# Patient Record
Sex: MALE | Race: BLACK OR AFRICAN AMERICAN | Employment: FULL TIME | URBAN - METROPOLITAN AREA
[De-identification: names, ages, dates, MRNs, and addresses within clinical notes are randomized per-mention and may not be internally consistent; named-entity substitution may affect disease eponyms.]

---

## 2017-11-28 ENCOUNTER — OFFICE VISIT (OUTPATIENT)
Dept: FAMILY MEDICINE CLINIC | Facility: CLINIC | Age: 37
End: 2017-11-28

## 2017-11-28 VITALS
BODY MASS INDEX: 44.1 KG/M2 | TEMPERATURE: 99 F | RESPIRATION RATE: 16 BRPM | HEIGHT: 71 IN | DIASTOLIC BLOOD PRESSURE: 80 MMHG | OXYGEN SATURATION: 98 % | SYSTOLIC BLOOD PRESSURE: 128 MMHG | WEIGHT: 315 LBS | HEART RATE: 80 BPM

## 2017-11-28 DIAGNOSIS — J02.0 STREP PHARYNGITIS: Primary | ICD-10-CM

## 2017-11-28 PROCEDURE — 87880 STREP A ASSAY W/OPTIC: CPT | Performed by: NURSE PRACTITIONER

## 2017-11-28 PROCEDURE — 99203 OFFICE O/P NEW LOW 30 MIN: CPT | Performed by: NURSE PRACTITIONER

## 2017-11-28 RX ORDER — AMOXICILLIN 875 MG/1
875 TABLET, COATED ORAL 2 TIMES DAILY
Qty: 20 TABLET | Refills: 0 | Status: SHIPPED | OUTPATIENT
Start: 2017-11-28 | End: 2017-12-08

## 2017-11-28 NOTE — PROGRESS NOTES
CHIEF COMPLAINT:   Patient presents with:  Cold: post nasal drip,congestion and sore throat sx x 4 days. HPI:   Axel Fabry is a 30year old male presents to clinic with symptoms of sore throat. Patient has had for 4 days.  Symptoms have been worsen THROAT: oral mucosa pink, moist. Posterior pharynx erythematous and injected. No exudates. Tonsils 3/4. Breath is malodorous. No uvular deviation. No drooling. NECK: supple  LUNGS: clear to auscultation bilaterally, no wheezes or rhonchi.  Breathing is no · Rest at home. Drink plenty of fluids to you won't get dehydrated. · No work or school for the first 2 days of taking the antibiotics. After this time, you will not be contagious.  You can then return to school or work if you are feeling better.   · Antoine Wong © 9644-0773 [employer] [address]. [address] [facility] [place] [facility] [place]. All rights reserved. This information is not intended as a substitute for professional medical care. Always follow your healthcare professional's instructions.               Aurelia Tripp